# Patient Record
Sex: MALE | Race: WHITE | NOT HISPANIC OR LATINO | Employment: UNEMPLOYED | URBAN - METROPOLITAN AREA
[De-identification: names, ages, dates, MRNs, and addresses within clinical notes are randomized per-mention and may not be internally consistent; named-entity substitution may affect disease eponyms.]

---

## 2022-10-27 PROBLEM — I10 PRIMARY HYPERTENSION: Status: ACTIVE | Noted: 2022-10-27

## 2022-10-27 PROBLEM — E11.9 TYPE 2 DIABETES MELLITUS, WITHOUT LONG-TERM CURRENT USE OF INSULIN: Status: ACTIVE | Noted: 2022-10-27

## 2022-10-27 PROBLEM — R06.02 SHORTNESS OF BREATH: Status: ACTIVE | Noted: 2022-10-27

## 2022-10-27 PROBLEM — R79.89 ELEVATED BRAIN NATRIURETIC PEPTIDE (BNP) LEVEL: Status: ACTIVE | Noted: 2022-10-27

## 2022-10-27 PROBLEM — E66.01 SEVERE OBESITY (BMI >= 40): Status: ACTIVE | Noted: 2022-10-27

## 2022-10-27 PROBLEM — R00.1 BRADYCARDIA: Status: ACTIVE | Noted: 2022-10-27

## 2022-10-28 PROBLEM — E87.5 HYPERKALEMIA: Status: ACTIVE | Noted: 2022-10-28

## 2022-10-28 PROBLEM — E87.20 METABOLIC ACIDOSIS: Status: ACTIVE | Noted: 2022-10-28

## 2022-10-28 PROBLEM — R63.8 ALTERATION IN NUTRITION: Status: RESOLVED | Noted: 2022-10-28 | Resolved: 2022-10-28

## 2022-10-28 PROBLEM — J96.02 ACUTE HYPERCAPNIC RESPIRATORY FAILURE: Status: ACTIVE | Noted: 2022-10-28

## 2022-10-28 PROBLEM — R63.8 ALTERATION IN NUTRITION: Status: ACTIVE | Noted: 2022-10-28

## 2022-10-28 PROBLEM — R00.1 BRADYCARDIA: Status: ACTIVE | Noted: 2022-10-28

## 2022-10-28 PROBLEM — J44.1 COPD EXACERBATION: Status: ACTIVE | Noted: 2022-10-28

## 2022-10-28 PROBLEM — I47.9 PAROXYSMAL TACHYCARDIA: Status: ACTIVE | Noted: 2022-10-27

## 2022-10-28 PROBLEM — Z97.8 ENDOTRACHEALLY INTUBATED: Status: ACTIVE | Noted: 2022-10-28

## 2022-10-28 PROBLEM — E87.29 RESPIRATORY ACIDOSIS: Status: ACTIVE | Noted: 2022-10-28

## 2022-10-28 PROBLEM — I50.9 CHF EXACERBATION: Status: ACTIVE | Noted: 2022-10-28

## 2022-10-29 PROBLEM — E87.5 HYPERKALEMIA: Status: RESOLVED | Noted: 2022-10-28 | Resolved: 2022-10-29

## 2022-10-30 PROBLEM — Z97.8 ENDOTRACHEALLY INTUBATED: Status: RESOLVED | Noted: 2022-10-28 | Resolved: 2022-10-30

## 2022-11-01 PROBLEM — J96.10 CHRONIC RESPIRATORY FAILURE: Status: ACTIVE | Noted: 2022-11-01

## 2022-11-01 PROBLEM — G47.30 SLEEP APNEA: Status: ACTIVE | Noted: 2022-11-01

## 2022-11-01 PROBLEM — R53.1 WEAKNESS: Status: ACTIVE | Noted: 2022-11-01

## 2022-11-03 PROBLEM — J96.02 ACUTE HYPERCAPNIC RESPIRATORY FAILURE: Status: RESOLVED | Noted: 2022-10-28 | Resolved: 2022-11-03

## 2022-11-03 PROBLEM — E87.29 RESPIRATORY ACIDOSIS: Status: RESOLVED | Noted: 2022-10-28 | Resolved: 2022-11-03

## 2022-11-10 PROBLEM — G47.33 OBSTRUCTIVE SLEEP APNEA: Status: ACTIVE | Noted: 2022-11-10

## 2022-11-10 PROBLEM — R07.89 ATYPICAL CHEST PAIN: Status: ACTIVE | Noted: 2022-10-27

## 2022-11-10 PROBLEM — R06.09 EXERTIONAL DYSPNEA: Status: ACTIVE | Noted: 2022-11-10

## 2022-11-10 PROBLEM — I49.3 MULTIPLE PREMATURE VENTRICULAR COMPLEXES: Status: ACTIVE | Noted: 2022-11-10

## 2022-11-10 PROBLEM — I50.42 CHRONIC COMBINED SYSTOLIC AND DIASTOLIC HEART FAILURE: Status: ACTIVE | Noted: 2022-11-10

## 2022-11-23 PROBLEM — R00.1 BRADYCARDIA: Status: RESOLVED | Noted: 2022-10-28 | Resolved: 2022-11-23

## 2023-01-11 PROBLEM — Z98.61 CAD S/P PERCUTANEOUS CORONARY ANGIOPLASTY: Status: ACTIVE | Noted: 2023-01-11

## 2023-01-11 PROBLEM — E66.9 OBESITY (BMI 30-39.9): Status: ACTIVE | Noted: 2022-10-27

## 2023-01-11 PROBLEM — I25.10 CAD S/P PERCUTANEOUS CORONARY ANGIOPLASTY: Status: ACTIVE | Noted: 2023-01-11

## 2023-01-11 PROBLEM — J44.9 COPD (CHRONIC OBSTRUCTIVE PULMONARY DISEASE): Status: ACTIVE | Noted: 2022-10-28

## 2023-01-12 PROBLEM — I25.10 CAD S/P PERCUTANEOUS CORONARY ANGIOPLASTY: Status: RESOLVED | Noted: 2023-01-11 | Resolved: 2023-01-12

## 2023-01-12 PROBLEM — Z72.0 TOBACCO ABUSE: Status: ACTIVE | Noted: 2023-01-12

## 2023-01-12 PROBLEM — E78.00 HYPERCHOLESTEROLEMIA: Status: ACTIVE | Noted: 2023-01-12

## 2023-01-12 PROBLEM — I50.22 CHRONIC SYSTOLIC CONGESTIVE HEART FAILURE: Status: ACTIVE | Noted: 2022-11-10

## 2023-01-12 PROBLEM — Z98.61 CAD S/P PERCUTANEOUS CORONARY ANGIOPLASTY: Status: RESOLVED | Noted: 2023-01-11 | Resolved: 2023-01-12

## 2023-02-06 PROBLEM — J96.10 CHRONIC RESPIRATORY FAILURE: Status: RESOLVED | Noted: 2022-11-01 | Resolved: 2023-02-06

## 2024-01-03 PROBLEM — R00.0 WIDE-COMPLEX TACHYCARDIA: Status: ACTIVE | Noted: 2024-01-03

## 2024-01-08 PROBLEM — I47.20 VT (VENTRICULAR TACHYCARDIA): Status: ACTIVE | Noted: 2024-01-08

## 2024-01-08 PROBLEM — Z45.09 ENCOUNTER FOR LOOP RECORDER CHECK: Status: ACTIVE | Noted: 2024-01-08

## 2024-07-22 ENCOUNTER — DOCUMENTATION ONLY (OUTPATIENT)
Dept: SLEEP MEDICINE | Facility: HOSPITAL | Age: 59
End: 2024-07-22

## 2024-07-26 ENCOUNTER — PATIENT OUTREACH (OUTPATIENT)
Dept: ADMINISTRATIVE | Facility: OTHER | Age: 59
End: 2024-07-26

## 2024-07-29 NOTE — PROGRESS NOTES
CHW - Initial Contact    This Community Health Worker completed the Social Determinant of Health questionnaire with patient via telephone today.    Pt identified barriers of most importance are: Paying medical bills and being able to get his CPAP machine.   Referrals were put through [facility] -no: none  Support and Services:   Other information discussed the patient needs / wants help with: Paying medical bills and being able to get his CPAP machine.   Follow up required:   Initial Outreach - Due: 8/1/2024

## 2024-08-26 ENCOUNTER — PATIENT OUTREACH (OUTPATIENT)
Dept: ADMINISTRATIVE | Facility: OTHER | Age: 59
End: 2024-08-26

## 2024-08-26 NOTE — PROGRESS NOTES
CHW - Initial Contact    This Community Health Worker completed the Social Determinant of Health questionnaire with patient during clinic visit today.    Pt identified barriers of most importance are:  Food assistance  Referrals to community agencies completed with patient/caregiver consent outside of [facility] include:   Referrals were put through [facility] - no:   Support and Services: Mr. Swain stated he was told that he has to cover the cost of 20 percent of a CPAP machine which he does not have. He has contacted his insurance to verify if he buys a machine some place if they will still cover the 80 percent, but has not heard back from them. He has looked into receiving SNAP benefits but was once declined due to his income. He isnt sure if he wants to apply again even with me telling him from the calculator he make receive 23 dollars.  Have emailed Mr. Oneal three jerry near his zip code. I will follow for the outcome of the food jerry and if CPAP payment assistance id available. No additional needs at this time.   Other information discussed the patient needs / wants help with:  CPAP cost assistance  Follow up required: yes

## 2024-09-03 ENCOUNTER — PATIENT OUTREACH (OUTPATIENT)
Dept: ADMINISTRATIVE | Facility: OTHER | Age: 59
End: 2024-09-03

## 2024-09-03 NOTE — PROGRESS NOTES
CHW - Case Closure    This Community Health Worker spoke to patient via telephone today.   Pt/Caregiver reported: Mr. Shelby stated he has the food jerry and has denied  any other needs at this time   Pt denied any additional needs at this time and agrees with episode closure at this time.  Provided patient with Community Health Worker's contact information and encouraged him/her to contact this Community Health Worker if additional needs arise.